# Patient Record
Sex: MALE | Race: BLACK OR AFRICAN AMERICAN | Employment: UNEMPLOYED
[De-identification: names, ages, dates, MRNs, and addresses within clinical notes are randomized per-mention and may not be internally consistent; named-entity substitution may affect disease eponyms.]

---
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
## 2021-06-30 ENCOUNTER — HOSPITAL ENCOUNTER (EMERGENCY)
Age: 21
Discharge: HOME | End: 2021-06-30
Attending: EMERGENCY MEDICINE
Payer: COMMERCIAL

## 2021-06-30 VITALS
RESPIRATION RATE: 18 BRPM | OXYGEN SATURATION: 97 % | DIASTOLIC BLOOD PRESSURE: 73 MMHG | HEIGHT: 72 IN | SYSTOLIC BLOOD PRESSURE: 121 MMHG | WEIGHT: 140 LBS | TEMPERATURE: 97.3 F | BODY MASS INDEX: 18.96 KG/M2 | HEART RATE: 64 BPM

## 2021-06-30 DIAGNOSIS — N34.2 URETHRITIS: ICD-10-CM

## 2021-06-30 DIAGNOSIS — Z20.2 STD EXPOSURE: Primary | ICD-10-CM

## 2021-06-30 PROCEDURE — 99284 EMERGENCY DEPT VISIT MOD MDM: CPT

## 2021-06-30 PROCEDURE — 6360000002 HC RX W HCPCS: Performed by: EMERGENCY MEDICINE

## 2021-06-30 PROCEDURE — 96372 THER/PROPH/DIAG INJ SC/IM: CPT

## 2021-06-30 RX ORDER — DOXYCYCLINE HYCLATE 100 MG
100 TABLET ORAL 2 TIMES DAILY
Qty: 14 TABLET | Refills: 0 | Status: SHIPPED | OUTPATIENT
Start: 2021-06-30 | End: 2021-07-10

## 2021-06-30 RX ORDER — METRONIDAZOLE 500 MG/1
500 TABLET ORAL 2 TIMES DAILY
Qty: 14 TABLET | Refills: 0 | Status: SHIPPED | OUTPATIENT
Start: 2021-06-30 | End: 2021-07-07

## 2021-06-30 RX ORDER — CEFTRIAXONE 1 G/1
500 INJECTION, POWDER, FOR SOLUTION INTRAMUSCULAR; INTRAVENOUS ONCE
Status: COMPLETED | OUTPATIENT
Start: 2021-06-30 | End: 2021-06-30

## 2021-06-30 RX ADMIN — CEFTRIAXONE SODIUM 500 MG: 1 INJECTION, POWDER, FOR SOLUTION INTRAMUSCULAR; INTRAVENOUS at 03:16

## 2021-06-30 ASSESSMENT — ENCOUNTER SYMPTOMS
ABDOMINAL PAIN: 0
NAUSEA: 0
COUGH: 0
VOMITING: 0

## 2021-06-30 NOTE — ED PROVIDER NOTES
[address] [facility]  EMERGENCY DEPARTMENT ENCOUNTER      Pt Name: Geovanny Ceja  MRN: [id number]  [place] 2000  Date of evaluation: 6/30/21      CHIEF COMPLAINT       Chief Complaint   Patient presents with    Exposure to STD     HISTORY OF PRESENT ILLNESS   HPI 24 y.o. male presents with c/o presents with complaints of STD exposure. Patient reports that his sexual partner informed him that she was diagnosed with trichomonas and chlamydia. he reports that he has been having burning with urination for the last few days. He reports that he sexually active with 1 female partner. He denies any abdominal pain nausea or vomiting. He does report that he had some alcohol to drink prior to presentation. REVIEW OF SYSTEMS       Review of Systems   Constitutional: Negative for fever. HENT: Negative for congestion. Eyes: Negative for visual disturbance. Respiratory: Negative for cough. Cardiovascular: Negative for chest pain. Gastrointestinal: Negative for abdominal pain, nausea and vomiting. Genitourinary: Positive for dysuria. Negative for testicular pain. Musculoskeletal: Negative for arthralgias. Skin: Negative for rash. Neurological: Negative for headaches. PAST MEDICAL HISTORY   History reviewed. No pertinent past medical history. SURGICAL HISTORY     History reviewed. No pertinent surgical history. CURRENT MEDICATIONS       Discharge Medication List as of 6/30/2021  3:13 AM          ALLERGIES     is allergic to seasonal.    FAMILY HISTORY     has no family status information on file. SOCIAL HISTORY      reports that he has never smoked. He does not have any smokeless tobacco history on file. He reports that he does not drink alcohol and does not use drugs.     PHYSICAL EXAM     INITIAL VITALS: /73   Pulse 64   Temp 97.3 °F (36.3 °C) (Oral)   Resp 18   Ht 6' (1.829 m)   Wt 140 lb (63.5 kg)   SpO2 97%   BMI 18.99 kg/m²   Gen: NAD  Head: Normocephalic, atraumatic  Eye: Pupils equal round reactive to light, no conjunctivitis  ENT: MMM  Neck: No adenopathy JVD  Heart: Regular rate and rhythm no murmurs  Lungs: Clear to auscultation bilaterally, no respiratory distress  Abdomen: Soft, nontender, nondistended, with no peritoneal signs  : Patient declines genital exam   Neurologic: Patient is alert and oriented x3, ambulatory with a normal gait, fluent speech  Extremities: No rash or edema    MEDICAL DECISION MAKING:     MDM  24 y.o. male presenting with STI exposure and burning with urination. He is declining providing a urine sample or any genital exam.  Given this reported positive infection in his sexual partner, we will treat him empirically. D/w pt treatment plan, warning precautions for prompt ED return and importance of close OP FU, he verbalizes understanding and agrees with the treatment plan. DIAGNOSTIC RESULTS     EMERGENCY DEPARTMENT COURSE:   Vitals:    Vitals:    06/30/21 0251   BP: 121/73   Pulse: 64   Resp: 18   Temp: 97.3 °F (36.3 °C)   TempSrc: Oral   SpO2: 97%   Weight: 140 lb (63.5 kg)   Height: 6' (1.829 m)       The patient was given the following medications while in the emergency department:  Orders Placed This Encounter   Medications    cefTRIAXone (ROCEPHIN) injection 500 mg     Order Specific Question:   Antimicrobial Indications     Answer:   STD infection    doxycycline hyclate (VIBRA-TABS) 100 MG tablet     Sig: Take 1 tablet by mouth 2 times daily for 10 days     Dispense:  14 tablet     Refill:  0    metroNIDAZOLE (FLAGYL) 500 MG tablet     Sig: Take 1 tablet by mouth 2 times daily for 7 days     Dispense:  14 tablet     Refill:  0     -------------------------  CRITICAL CARE:   CONSULTS: None  PROCEDURES: Procedures     FINAL IMPRESSION      1. STD exposure    2.  Urethritis          DISPOSITION/PLAN   DISPOSITION Decision To Discharge 06/30/2021 02:57:10 AM      PATIENT REFERRED TO:  Lisa Solis, [address] [place] [address]

## 2021-06-30 NOTE — ED NOTES
Mode of arrival (squad #, walk in, police, etc) : walk-in    Chief complaint(s): exposure to STD    Arrival Note (brief scenario, treatment PTA, etc). : Pt arrives to ED c/o exposure to STD's. States it burns when he urinates.            Hima Noe RN  06/30/21 3110

## 2024-04-17 ENCOUNTER — HOSPITAL ENCOUNTER (EMERGENCY)
Age: 24
Discharge: HOME | End: 2024-04-17
Attending: EMERGENCY MEDICINE
Payer: COMMERCIAL

## 2024-04-17 VITALS
OXYGEN SATURATION: 98 % | WEIGHT: 140 LBS | RESPIRATION RATE: 16 BRPM | BODY MASS INDEX: 18.96 KG/M2 | SYSTOLIC BLOOD PRESSURE: 142 MMHG | TEMPERATURE: 98.9 F | DIASTOLIC BLOOD PRESSURE: 81 MMHG | HEIGHT: 72 IN | HEART RATE: 74 BPM

## 2024-04-17 DIAGNOSIS — Z20.2 POSSIBLE EXPOSURE TO STI: Primary | ICD-10-CM

## 2024-04-17 PROCEDURE — 87491 CHLMYD TRACH DNA AMP PROBE: CPT

## 2024-04-17 PROCEDURE — 99284 EMERGENCY DEPT VISIT MOD MDM: CPT

## 2024-04-17 PROCEDURE — 96372 THER/PROPH/DIAG INJ SC/IM: CPT

## 2024-04-17 PROCEDURE — 87591 N.GONORRHOEAE DNA AMP PROB: CPT

## 2024-04-17 PROCEDURE — 87661 TRICHOMONAS VAGINALIS AMPLIF: CPT

## 2024-04-17 PROCEDURE — 6370000000 HC RX 637 (ALT 250 FOR IP): Performed by: PHYSICIAN ASSISTANT

## 2024-04-17 PROCEDURE — 6360000002 HC RX W HCPCS: Performed by: PHYSICIAN ASSISTANT

## 2024-04-17 RX ORDER — WATER 10 ML/10ML
INJECTION INTRAMUSCULAR; INTRAVENOUS; SUBCUTANEOUS
Status: DISCONTINUED
Start: 2024-04-17 | End: 2024-04-17 | Stop reason: HOSPADM

## 2024-04-17 RX ORDER — CEFTRIAXONE 500 MG/1
500 INJECTION, POWDER, FOR SOLUTION INTRAMUSCULAR; INTRAVENOUS ONCE
Status: COMPLETED | OUTPATIENT
Start: 2024-04-17 | End: 2024-04-17

## 2024-04-17 RX ORDER — DOXYCYCLINE HYCLATE 100 MG
100 TABLET ORAL 2 TIMES DAILY
Qty: 14 TABLET | Refills: 0 | Status: SHIPPED | OUTPATIENT
Start: 2024-04-17 | End: 2024-04-24

## 2024-04-17 RX ORDER — DOXYCYCLINE 100 MG/1
100 CAPSULE ORAL ONCE
Status: COMPLETED | OUTPATIENT
Start: 2024-04-17 | End: 2024-04-17

## 2024-04-17 RX ORDER — METRONIDAZOLE 500 MG/1
2000 TABLET ORAL ONCE
Status: COMPLETED | OUTPATIENT
Start: 2024-04-17 | End: 2024-04-17

## 2024-04-17 RX ADMIN — DOXYCYCLINE 100 MG: 100 CAPSULE ORAL at 12:42

## 2024-04-17 RX ADMIN — CEFTRIAXONE SODIUM 500 MG: 500 INJECTION, POWDER, FOR SOLUTION INTRAMUSCULAR; INTRAVENOUS at 12:42

## 2024-04-17 RX ADMIN — METRONIDAZOLE 2000 MG: 500 TABLET ORAL at 12:42

## 2024-04-17 NOTE — ED PROVIDER NOTES
eMERGENCY dEPARTMENT eNCOUnter   Independent Attestation     Pt Name: Alayna Ware  MRN: [id number]  Birthdate 2000  Date of evaluation: 4/17/24     Alayna Ware is a 23 y.o. male with CC: Exposure to STD      Based on the medical record the care appears appropriate.  I was personally available for consultation in the Emergency Department.    Richard Canales DO  Attending Emergency Physician                  Richard Canales DO  04/17/24 4195

## 2024-04-17 NOTE — DISCHARGE INSTRUCTIONS
You can call the ER at [phone] or log into [facility] in 48 hrs for your final test results. Avoid sexual intercourse until you have completed all of your antibiotics.

## 2024-04-17 NOTE — ED PROVIDER NOTES
EMERGENCY DEPARTMENT ENCOUNTER    Pt Name: Alayna Ware  MRN: [id number]  Birthdate 2000  Date of evaluation: 4/17/24  CHIEF COMPLAINT       Chief Complaint   Patient presents with    Exposure to STD     HISTORY OF PRESENT ILLNESS   Patient states that one of his sexual partners informed him that she was exposed to gonorrhea and chlamydia. He is here requesting treatment for both. He denies any discharge, pain, or swelling in his genital area.     PHYSICAL EXAM       ED Triage Vitals   BP Temp Temp Source Pulse Respirations SpO2 Height Weight - Scale   04/17/24 1154 04/17/24 1157 04/17/24 1157 04/17/24 1154 04/17/24 1154 04/17/24 1154 04/17/24 1154 04/17/24 1154   (!) 142/81 98.9 °F (37.2 °C) Temporal 74 16 98 % 1.829 m (6') 63.5 kg (140 lb)     Nursing note and vitals reviewed  General: Patient is alert and oriented, no acute distress, well-developed, well-nourished   : deferred by patient    MEDICAL DECISION MAKING AND ED COURSE:   1)  Number and Complexity of Problems  Problem List This Visit: possible STI exposure    Differential Diagnosis: gonorrhea, chlamydia, trichomonas    Diagnoses Considered but Do Not Suspect:  epididymitis, orchitis    2)  Treatment and Disposition  Disposition discussion with patient/family, Shared Decision Making: Presumptive treatment provided as requested. He was advised to either call the ED or check his MyChart in 48 hours for final test results. Avoid sex in the meantime.  Anticipatory guidance provided regarding reasons to return to the ED. Patient and/or family verbalized understanding of the plan and all questions were addressed.    DATA FOR LAB AND RADIOLOGY TESTS ORDERED BELOW ARE REVIEWED BY THE ED CLINICIAN:    RADIOLOGY: All x-rays, CT, MRI, and formal ultrasound images (except ED bedside ultrasound) are read by the radiologist, see reports below, unless otherwise noted in MDM or here.  Reports below are reviewed by myself.  No orders to display       LABS: Lab orders

## 2024-04-17 NOTE — ED NOTES
Mode of arrival (squad #, walk in, police, etc) : walk in         Chief complaint(s): std exposure         Arrival Note (brief scenario, treatment PTA, etc).: Pt states his partner told him to come get treated         C= \"Have you ever felt that you should Cut down on your drinking?\"  No  A= \"Have people Annoyed you by criticizing your drinking?\"  No  G= \"Have you ever felt bad or Guilty about your drinking?\"  No  E= \"Have you ever had a drink as an Eye-opener first thing in the morning to steady your nerves or to help a hangover?\"  No      Deferred []      Reason for deferring: N/A    *If yes to two or more: probable alcohol abuse.*

## 2024-04-18 LAB
CHLAMYDIA DNA UR QL NAA+PROBE: NEGATIVE
N GONORRHOEA DNA UR QL NAA+PROBE: ABNORMAL
SOURCE: NORMAL
SPECIMEN DESCRIPTION: ABNORMAL
TRICHOMONAS VAGINALI, MOLECULAR: NEGATIVE

## 2025-04-10 ENCOUNTER — APPOINTMENT (OUTPATIENT)
Dept: GENERAL RADIOLOGY | Age: 25
End: 2025-04-10
Payer: COMMERCIAL

## 2025-04-10 ENCOUNTER — HOSPITAL ENCOUNTER (EMERGENCY)
Age: 25
Discharge: HOME | End: 2025-04-10
Attending: EMERGENCY MEDICINE
Payer: COMMERCIAL

## 2025-04-10 VITALS
HEART RATE: 67 BPM | RESPIRATION RATE: 20 BRPM | WEIGHT: 140 LBS | BODY MASS INDEX: 18.96 KG/M2 | TEMPERATURE: 97.9 F | DIASTOLIC BLOOD PRESSURE: 78 MMHG | OXYGEN SATURATION: 97 % | SYSTOLIC BLOOD PRESSURE: 142 MMHG | HEIGHT: 72 IN

## 2025-04-10 DIAGNOSIS — M25.532 WRIST PAIN, LEFT: Primary | ICD-10-CM

## 2025-04-10 PROCEDURE — 99283 EMERGENCY DEPT VISIT LOW MDM: CPT

## 2025-04-10 PROCEDURE — 6370000000 HC RX 637 (ALT 250 FOR IP): Performed by: PHYSICIAN ASSISTANT

## 2025-04-10 PROCEDURE — 73110 X-RAY EXAM OF WRIST: CPT

## 2025-04-10 RX ORDER — ACETAMINOPHEN 325 MG/1
650 TABLET ORAL ONCE
Status: COMPLETED | OUTPATIENT
Start: 2025-04-10 | End: 2025-04-10

## 2025-04-10 RX ADMIN — ACETAMINOPHEN 650 MG: 325 TABLET, FILM COATED ORAL at 13:09

## 2025-04-10 ASSESSMENT — PAIN SCALES - GENERAL: PAINLEVEL_OUTOF10: 10

## 2025-04-10 ASSESSMENT — PAIN DESCRIPTION - ORIENTATION: ORIENTATION: LEFT

## 2025-04-10 ASSESSMENT — LIFESTYLE VARIABLES
HOW MANY STANDARD DRINKS CONTAINING ALCOHOL DO YOU HAVE ON A TYPICAL DAY: PATIENT DOES NOT DRINK
HOW OFTEN DO YOU HAVE A DRINK CONTAINING ALCOHOL: NEVER

## 2025-04-10 ASSESSMENT — PAIN DESCRIPTION - LOCATION: LOCATION: WRIST

## 2025-04-10 NOTE — DISCHARGE INSTRUCTIONS
Ice and elevate  Tylenol or motrin for pain idf needed  Splint for support and comfort  F/U with ortho if pain persists  Return if worse or other concerns

## 2025-04-10 NOTE — ED TRIAGE NOTES
Mode of arrival (squad #, walk in, police, etc) : walk-in        Chief complaint(s): left right wrist and knee pain         Arrival Note (brief scenario, treatment PTA, etc).: Pt reports above symptoms after fall down some stairs this morning at 0600        C= \"Have you ever felt that you should Cut down on your drinking?\"  No  A= \"Have people Annoyed you by criticizing your drinking?\"  No  G= \"Have you ever felt bad or Guilty about your drinking?\"  No  E= \"Have you ever had a drink as an Eye-opener first thing in the morning to steady your nerves or to help a hangover?\"  No      Deferred []      Reason for deferring: N/A    *If yes to two or more: probable alcohol abuse.*

## 2025-04-10 NOTE — ED PROVIDER NOTES
eMERGENCY dEPARTMENT  Attending Physician Attestation     Pt Name: Alayna Ware  MRN: [id number]  Birthdate 2000  Date of evaluation: 4/10/25     Alayna Ware is a 24 y.o. male with CC: Wrist Pain and Knee Pain      Based on the medical record the care appears appropriate.  I was personally available for consultation in the Emergency Department.    Rogelio Junior MD  Attending Emergency Physician                 Rogelio Junior MD  04/10/25 7008

## 2025-04-10 NOTE — ED PROVIDER NOTES
[facility] EMERGENCY DEPARTMENT  eMERGENCY dEPARTMENT eNCOUnter      Pt Name: Alayna Ware  MRN: [id number]  Birthdate 2000  Date of evaluation: 4/10/25      CHIEF COMPLAINT       Chief Complaint   Patient presents with    Wrist Pain    Knee Pain         HISTORY OF PRESENT ILLNESS    Alayna Ware is a 24 y.o. male who presents complaining of left wrist pain states he fell this am, mechanical fall did not strike head did not lose consciousness  The history is provided by the patient.   Wrist Problem  Location:  Wrist  Wrist location:  L wrist  Injury: yes    Time since incident:  3 hours  Mechanism of injury: fall    Fall:     Fall occurred:  Down stairs    Impact surface:  Hard floor    Point of impact:  Outstretched arms    Entrapped after fall: no    Pain details:     Quality:  Aching    Radiates to:  Does not radiate    Severity:  Mild    Onset quality:  Sudden    Duration:  3 hours    Timing:  Intermittent    Progression:  Waxing and waning  Handedness:  Left-handed  Dislocation: no    Foreign body present:  No foreign bodies  Tetanus status:  Unknown  Prior injury to area:  Unable to specify  Relieved by:  Rest  Worsened by:  Movement  Ineffective treatments:  None tried  Associated symptoms: no numbness and no swelling        REVIEW OF SYSTEMS       Review of Systems   Musculoskeletal:  Positive for arthralgias.   All other systems reviewed and are negative.      PAST MEDICAL HISTORY   History reviewed. No pertinent past medical history.    SURGICAL HISTORY     History reviewed. No pertinent surgical history.    CURRENT MEDICATIONS       There are no discharge medications for this patient.      ALLERGIES     is allergic to seasonal.    FAMILY HISTORY     has no family status information on file.        SOCIAL HISTORY      reports that he has never smoked. He does not have any smokeless tobacco history on file. He reports current drug use. Drug: Marijuana (Weed). He reports that he does not drink